# Patient Record
Sex: MALE | Race: WHITE | NOT HISPANIC OR LATINO | Employment: UNEMPLOYED | URBAN - METROPOLITAN AREA
[De-identification: names, ages, dates, MRNs, and addresses within clinical notes are randomized per-mention and may not be internally consistent; named-entity substitution may affect disease eponyms.]

---

## 2018-10-18 ENCOUNTER — NEW PATIENT (OUTPATIENT)
Dept: URBAN - METROPOLITAN AREA CLINIC 43 | Facility: CLINIC | Age: 68
End: 2018-10-18

## 2018-10-18 VITALS
HEART RATE: 64 BPM | SYSTOLIC BLOOD PRESSURE: 153 MMHG | RESPIRATION RATE: 18 BRPM | DIASTOLIC BLOOD PRESSURE: 98 MMHG | HEIGHT: 60 IN

## 2018-10-18 DIAGNOSIS — H25.811: ICD-10-CM

## 2018-10-18 DIAGNOSIS — H04.123: ICD-10-CM

## 2018-10-18 DIAGNOSIS — H25.812: ICD-10-CM

## 2018-10-18 PROCEDURE — G9903 PT SCRN TBCO ID AS NON USER: HCPCS

## 2018-10-18 PROCEDURE — 92004 COMPRE OPH EXAM NEW PT 1/>: CPT

## 2018-10-18 PROCEDURE — 92136TC INTERFEROMETRY - TECHNICAL COMPONENT

## 2018-10-18 PROCEDURE — 1036F TOBACCO NON-USER: CPT

## 2018-10-18 PROCEDURE — 76512 OPH US DX B-SCAN: CPT

## 2018-10-18 PROCEDURE — G8952 PRE-HTN/HTN, NO F/U, NOT GVN: HCPCS

## 2018-10-18 PROCEDURE — G8427 DOCREV CUR MEDS BY ELIG CLIN: HCPCS

## 2018-10-18 RX ORDER — PREDNISOLONE ACETATE 10 MG/ML: 1 SUSPENSION/ DROPS OPHTHALMIC

## 2018-10-18 RX ORDER — NEPAFENAC 3 MG/ML: 1 SUSPENSION/ DROPS OPHTHALMIC ONCE A DAY

## 2018-10-18 RX ORDER — MOXIFLOXACIN HYDROCHLORIDE 5 MG/ML: 1 SOLUTION/ DROPS OPHTHALMIC

## 2018-10-18 ASSESSMENT — VISUAL ACUITY
OD_BAT: <20/400
OS_CC: J3
OS_PH: 20/70
OD_SC: <J12
OS_SC: <J12
OS_BAT: 20/200
OS_CC: 20/200
OD_CC: CF 2FT
OS_AM: 20/25-2
OD_CC: <J12

## 2018-10-18 ASSESSMENT — TONOMETRY
OD_IOP_MMHG: 15
OS_IOP_MMHG: 14

## 2018-11-19 ENCOUNTER — SURGERY/PROCEDURE (OUTPATIENT)
Dept: URBAN - METROPOLITAN AREA CLINIC 43 | Facility: CLINIC | Age: 68
End: 2018-11-19

## 2018-11-19 DIAGNOSIS — H25.811: ICD-10-CM

## 2018-11-19 DIAGNOSIS — H25.89: ICD-10-CM

## 2018-11-19 PROCEDURE — 66982CV COMPLEX CATARACT WITH IOL, CUSTOM VISION

## 2018-11-19 PROCEDURE — 66999LNSR LENSAR LASER FOR CAT SX

## 2018-11-20 ENCOUNTER — CATARACT POST-OP 1-DAY (OUTPATIENT)
Dept: URBAN - METROPOLITAN AREA CLINIC 36 | Facility: CLINIC | Age: 68
End: 2018-11-20

## 2018-11-20 DIAGNOSIS — Z96.1: ICD-10-CM

## 2018-11-20 PROCEDURE — 99024 POSTOP FOLLOW-UP VISIT: CPT

## 2018-11-20 ASSESSMENT — TONOMETRY
OD_IOP_MMHG: 15
OS_IOP_MMHG: 15

## 2018-11-20 ASSESSMENT — VISUAL ACUITY
OS_SC: J10
OD_SC: 20/200
OS_PH: 20/40
OS_SC: 20/60-1
OD_SC: J12-1
OD_PH: 20/80

## 2018-11-21 ENCOUNTER — POST-OP (OUTPATIENT)
Dept: URBAN - METROPOLITAN AREA CLINIC 36 | Facility: CLINIC | Age: 68
End: 2018-11-21

## 2018-11-21 DIAGNOSIS — Z96.1: ICD-10-CM

## 2018-11-21 PROCEDURE — 99024 POSTOP FOLLOW-UP VISIT: CPT

## 2018-11-21 ASSESSMENT — VISUAL ACUITY
OD_SC: 20/100
OD_PH: 20/70-1
OS_SC: 20/60

## 2018-11-21 ASSESSMENT — TONOMETRY
OD_IOP_MMHG: 20
OS_IOP_MMHG: 12
OD_IOP_MMHG: 18

## 2018-11-26 ENCOUNTER — POST OP/EVAL OF SECOND EYE (OUTPATIENT)
Dept: URBAN - METROPOLITAN AREA CLINIC 36 | Facility: CLINIC | Age: 68
End: 2018-11-26

## 2018-11-26 DIAGNOSIS — Z96.1: ICD-10-CM

## 2018-11-26 DIAGNOSIS — H25.812: ICD-10-CM

## 2018-11-26 PROCEDURE — 99024 POSTOP FOLLOW-UP VISIT: CPT

## 2018-11-26 ASSESSMENT — VISUAL ACUITY
OS_BAT: 20/200
OD_SC: J10
OS_SC: J10
OS_PH: 20/40+2
OS_SC: 20/70-1
OD_SC: 20/20

## 2018-11-26 ASSESSMENT — TONOMETRY
OD_IOP_MMHG: 14
OS_IOP_MMHG: 14

## 2018-12-03 ENCOUNTER — PRE-OP/H&P (OUTPATIENT)
Dept: URBAN - METROPOLITAN AREA CLINIC 39 | Facility: CLINIC | Age: 68
End: 2018-12-03

## 2018-12-03 ENCOUNTER — SURGERY/PROCEDURE (OUTPATIENT)
Dept: URBAN - METROPOLITAN AREA CLINIC 43 | Facility: CLINIC | Age: 68
End: 2018-12-03

## 2018-12-03 VITALS
SYSTOLIC BLOOD PRESSURE: 153 MMHG | DIASTOLIC BLOOD PRESSURE: 98 MMHG | HEART RATE: 64 BPM | RESPIRATION RATE: 18 BRPM | HEIGHT: 60 IN

## 2018-12-03 DIAGNOSIS — H25.812: ICD-10-CM

## 2018-12-03 DIAGNOSIS — Z96.1: ICD-10-CM

## 2018-12-03 DIAGNOSIS — H25.811: ICD-10-CM

## 2018-12-03 PROCEDURE — G8418 CALC BMI BLW LOW PARAM F/U: HCPCS

## 2018-12-03 PROCEDURE — 1036F TOBACCO NON-USER: CPT

## 2018-12-03 PROCEDURE — 99211T TECH SERVICE

## 2018-12-03 PROCEDURE — 66999LNSRA LENSAR DURING CAT SX- ABORTED

## 2018-12-03 PROCEDURE — G8952 PRE-HTN/HTN, NO F/U, NOT GVN: HCPCS

## 2018-12-03 PROCEDURE — G9903 PT SCRN TBCO ID AS NON USER: HCPCS

## 2018-12-03 PROCEDURE — 4040F PNEUMOC VAC/ADMIN/RCVD: CPT

## 2018-12-03 PROCEDURE — G8428 CUR MEDS NOT DOCUMENT: HCPCS

## 2018-12-03 PROCEDURE — 66984CV REMOVE CATARACT, INSERT LENS, CUSTOM VISION

## 2018-12-03 PROCEDURE — G8483 FLU IMM NO ADMIN DOC REA: HCPCS

## 2018-12-04 ENCOUNTER — 1 DAY POST-OP (OUTPATIENT)
Dept: URBAN - METROPOLITAN AREA CLINIC 36 | Facility: CLINIC | Age: 68
End: 2018-12-04

## 2018-12-04 DIAGNOSIS — Z96.1: ICD-10-CM

## 2018-12-04 PROCEDURE — 99024 POSTOP FOLLOW-UP VISIT: CPT

## 2018-12-04 ASSESSMENT — VISUAL ACUITY
OS_SC: J6
OS_SC: 20/20
OD_SC: 20/25+1
OD_SC: J6

## 2018-12-04 ASSESSMENT — TONOMETRY
OD_IOP_MMHG: 14
OS_IOP_MMHG: 14

## 2018-12-10 ENCOUNTER — POST-OP CATARACT (OUTPATIENT)
Dept: URBAN - METROPOLITAN AREA CLINIC 36 | Facility: CLINIC | Age: 68
End: 2018-12-10

## 2018-12-10 DIAGNOSIS — Z96.1: ICD-10-CM

## 2018-12-10 PROCEDURE — 99024 POSTOP FOLLOW-UP VISIT: CPT

## 2018-12-10 ASSESSMENT — VISUAL ACUITY
OS_SC: 20/20
OD_SC: J5
OD_SC: 20/20-1
OS_SC: J5

## 2018-12-10 ASSESSMENT — TONOMETRY
OD_IOP_MMHG: 12
OS_IOP_MMHG: 12

## 2019-01-07 ENCOUNTER — POST-OP CATARACT (OUTPATIENT)
Dept: URBAN - METROPOLITAN AREA CLINIC 36 | Facility: CLINIC | Age: 69
End: 2019-01-07

## 2019-01-07 DIAGNOSIS — Z96.1: ICD-10-CM

## 2019-01-07 PROCEDURE — 99024 POSTOP FOLLOW-UP VISIT: CPT

## 2019-01-07 ASSESSMENT — VISUAL ACUITY
OS_SC: J6
OD_SC: J3
OS_SC: 20/20-1
OD_SC: 20/20

## 2019-01-07 ASSESSMENT — TONOMETRY
OS_IOP_MMHG: 14
OD_IOP_MMHG: 14

## 2019-06-27 ENCOUNTER — ESTABLISHED COMPREHENSIVE EXAM (OUTPATIENT)
Dept: URBAN - METROPOLITAN AREA CLINIC 36 | Facility: CLINIC | Age: 69
End: 2019-06-27

## 2019-06-27 DIAGNOSIS — H52.7: ICD-10-CM

## 2019-06-27 DIAGNOSIS — H26.491: ICD-10-CM

## 2019-06-27 DIAGNOSIS — H26.492: ICD-10-CM

## 2019-06-27 DIAGNOSIS — H04.123: ICD-10-CM

## 2019-06-27 PROCEDURE — 92014 COMPRE OPH EXAM EST PT 1/>: CPT

## 2019-06-27 PROCEDURE — 92015 DETERMINE REFRACTIVE STATE: CPT

## 2019-06-27 ASSESSMENT — VISUAL ACUITY
OD_SC: 20/20
OD_SC: J5-1
OS_CC: J1
OS_SC: J6
OS_SC: 20/25-1
OD_CC: J1+

## 2019-06-27 ASSESSMENT — TONOMETRY
OD_IOP_MMHG: 16
OS_IOP_MMHG: 16

## 2019-11-21 NOTE — PATIENT DISCUSSION
New Prescription: Besivance (besifloxacin): drops,suspension: 0.6% 1 drop four times a day as directed into left eye 11-

## 2019-11-21 NOTE — PATIENT DISCUSSION
OCULAR ROSACEA, OU:  PRESCRIBE WARM COMPRESSES,  EYELID SCRUBS QD-BID, AND GOOD QUALITY ARTIFICIAL TEARS BID-QID. RECOMMEND THE DAILY INTAKE OF OMEGA-3 FATTY ACIDS WITH PRIMARY CARE PHYSICIANS APPROVAL. RETURN FOR FOLLOW-UP AS SCHEDULED.

## 2019-11-21 NOTE — PATIENT DISCUSSION
New Prescription: amoxicillin (amoxicillin): tablet: 500 mg 1 tablet three times a day with meals by mouth 11-

## 2019-11-21 NOTE — PATIENT DISCUSSION
MILD PERCEPTUAL CELLULITIS OS: WITH INFLAMMATION AND TENDERNESS. RECOMMEND PT START MEDS TODAY AND CALL IF NO IMPROVEMENT. RX AMOXICILLIN 500 MGS PO BID X 7 DAYS AND BESIVANCE GTTS QID OS X 7  DAYS.

## 2019-11-21 NOTE — PATIENT DISCUSSION
MEIBOMIAN GLAND DYSFUNCTION, OU:  PRESCRIBE WARM COMPRESSES AND EYELID SCRUBS QD-BID, ARTIFICIAL TEARS BID-QID, THE DAILY INTAKE OF OMEGA-3 FATTY ACIDS. RETURN FOR FOLLOW-UP AS SCHEDULED.

## 2020-01-22 NOTE — PATIENT DISCUSSION
HYDROXYCHLOROQUINE (PLAQUENIL) USE:  NO OCULAR TOXICITY OU. CONTINUE PLAQUENIL AS DIRECTED. SCHEDULE YEARLY VF 10-2 RED,OCT MACULA, AND ASSESMENT OF COLOR PLATES WITH DILATED FUNDUS EXAM.  PT DIAGNOSED WITH ARMD, WHICH IS A RELATIVE CONTRAINDICATION FOR PLAQUENIL USE. F/U WITH DR MORALEZ FOR OTHER OPTIONS.

## 2020-07-22 NOTE — PATIENT DISCUSSION
HYDROXYCHLOROQUINE (PLAQUENIL) USE: NO OCULAR TOXICITY OU.  SCHEDULE YEARLY VF 10-2 RED,OCT MACULA, AND ASSESMENT OF COLOR PLATES WITH DILATED FUNDUS EXAM. PT DIAGNOSED WITH ARMD, WHICH IS A RELATIVE CONTRAINDICATION FOR PLAQUENIL USE. F/U WITH DR MORALEZ FOR OTHER OPTIONS.

## 2020-08-31 NOTE — PATIENT DISCUSSION
PLAQUENIL:  INSTRUCTED PATIENT TO STOP PLAQUENIL DUE TO DIAGNOSIS OF DRY AMD OU. NO EVIDENCE OF RETINAL TOXICITY. PATIENT HAS TAKEN PLAQUENIL FOR &lt;5 YEARS, NO FURTHER SCREENING REQUIRED AT THIS TIME. FOLLOW-UP WITH DR AVILA FOR MANAGEMENT OF ARTHRITIS.

## 2020-08-31 NOTE — PATIENT DISCUSSION
RETINA IS ATTACHED OU: ASTEROID HYALOSIS OS;  NO HOLES OR TEARS SEEN ON DILATED EXAM TODAY.  RETINAL DETACHMENT SIGNS AND SYMPTOMS REVIEWED

## 2021-01-26 NOTE — PATIENT DISCUSSION
PLAQUENIL: INSTRUCTED PATIENT TO STAY OFF PLAQUENIL DUE TO DIAGNOSIS OF DRY AMD OU. NO EVIDENCE OF RETINAL TOXICITY. PATIENT HAS TAKEN PLAQUENIL FOR &lt;5 YEARS, NO FURTHER SCREENING REQUIRED AT THIS TIME. FOLLOW-UP WITH DR AVILA FOR MANAGEMENT OF ARTHRITIS.

## 2022-05-16 NOTE — PATIENT DISCUSSION
INSTRUCTED PATIENT TO STAY OFF PLAQUENIL DUE TO DIAGNOSIS OF DRY AMD OU. NO EVIDENCE OF RETINAL TOXICITY. PATIENT HAS TAKEN PLAQUENIL FOR <5 YEARS. FOLLOW-UP WITH DR AVILA FOR MANAGEMENT OF ARTHRITIS.

## 2022-05-16 NOTE — PATIENT DISCUSSION
PHYSICIAN'S EXCUSE NOTE      Visit Date: 10/04/21      Re:   Soha VILLA George   [facility] [id number]                    Excused Dates: 10/04/21  through 10/6/21    Please excuse Soha Vazquez from work  due to illness. Okay to return sooner if feeling better and fever free (under 100.4) without medication.              Itzel Garcia DO     [facility]  [address] [place] [id number]  Ph: [phone]  Fax: [phone]   Moderate Dry Eyes: Prescribed disappearing preservative or preservative-free artificial tears 4-6x per day OU and the dailty intake of Omega-3 DHA/EPA fatty acids to help relieve symptoms. Add nightly lubricating ointment or gel.  Will consider Restasis or punctal plugs and/or Lipiflow treatment next visit if not responsive or if symptoms persist. Return for follow-up as scheduled or sooner if symptoms persist.

## 2022-05-16 NOTE — PATIENT DISCUSSION
[facility] monitoring, AREDS2 vitamins, no smoking, green leafy vegetables discussed.